# Patient Record
Sex: FEMALE | Race: WHITE
[De-identification: names, ages, dates, MRNs, and addresses within clinical notes are randomized per-mention and may not be internally consistent; named-entity substitution may affect disease eponyms.]

---

## 2017-01-18 ENCOUNTER — HOSPITAL ENCOUNTER (OUTPATIENT)
Dept: HOSPITAL 80 - CIMAGING | Age: 52
End: 2017-01-18
Attending: FAMILY MEDICINE
Payer: MEDICAID

## 2017-01-18 DIAGNOSIS — E78.2: ICD-10-CM

## 2017-01-18 DIAGNOSIS — G40.909: ICD-10-CM

## 2017-01-18 DIAGNOSIS — R22.1: Primary | ICD-10-CM

## 2017-01-18 DIAGNOSIS — N95.9: ICD-10-CM

## 2017-01-18 NOTE — US
Ultrasound of the Neck Soft Tissues



History: R22.1, swelling left side of the neck, E78.2, G40.909.



Technique: Longitudinal and transverse ultrasound imaging of the left neck soft tissues.



Findings: In the left neck soft tissues at the juncture of the left side of the neck and shoulder reg
ion ultrasound imaging was performed and demonstrates no evidence of discrete focal lesion. No fluid 
collection or cystic lesion is noted.



IMPRESSION:

1. Palpable abnormality in the left neck/shoulder region demonstrates no ultrasound evidence of discr
ete solid or cystic mass.

2. If palpable abnormality persists recommend additional imaging with either CT or MRI of the neck so
ft tissues.

## 2017-04-06 ENCOUNTER — HOSPITAL ENCOUNTER (OUTPATIENT)
Dept: HOSPITAL 80 - CIMAGING | Age: 52
End: 2017-04-06
Attending: FAMILY MEDICINE
Payer: MEDICAID

## 2017-04-06 DIAGNOSIS — R10.9: Primary | ICD-10-CM

## 2017-04-06 DIAGNOSIS — N88.9: ICD-10-CM

## 2017-04-06 DIAGNOSIS — Z87.442: ICD-10-CM

## 2017-07-10 ENCOUNTER — HOSPITAL ENCOUNTER (OUTPATIENT)
Dept: HOSPITAL 80 - FIMAGING | Age: 52
End: 2017-07-10
Attending: FAMILY MEDICINE
Payer: MEDICAID

## 2017-07-10 DIAGNOSIS — M22.41: Primary | ICD-10-CM

## 2017-07-10 DIAGNOSIS — M23.41: ICD-10-CM

## 2017-11-17 ENCOUNTER — HOSPITAL ENCOUNTER (EMERGENCY)
Dept: HOSPITAL 80 - CED | Age: 52
Discharge: HOME | End: 2017-11-17
Payer: MEDICAID

## 2017-11-17 VITALS
RESPIRATION RATE: 14 BRPM | HEART RATE: 78 BPM | TEMPERATURE: 98.8 F | DIASTOLIC BLOOD PRESSURE: 88 MMHG | SYSTOLIC BLOOD PRESSURE: 131 MMHG | OXYGEN SATURATION: 95 %
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
## 2017-11-17 DIAGNOSIS — S62.144A: Primary | ICD-10-CM

## 2017-11-17 PROCEDURE — A4565 SLINGS: HCPCS

## 2017-11-17 NOTE — EDPHY
H & P


Time Seen by Provider: 11/17/17 14:59


HPI/ROS: 





This patient complains of pain to the right hand/wrist area of the mid shaft of 

the 5th metacarpal rate heating into the wrist.  She explains that she stepped 

oddly causing right knee pain and stumble with a FOOSH mechanism injury to her 

right upper extremity at 1:30 a.m. today.  She has chronic right knee pains and 

reports that the mild pain in her right knee is no different from baseline for 

her.  However she reports that the wrist/hand pain is 5/10 at baseline sharp in 

nature and worse with movement.  She took ibuprofen shortly prior to arrival 

and thinks that that is starting to help with the severity.  No other 

exacerbating factors and no other injuries.





ROS:  Neuro:  No numbness or tingling associated with this injury


Integumentary:  No lacerations or abrasions


Musculoskeletal:  Chronic right knee pain as per HPI.  No other acute injuries 

besides the wrist/hand.


5 point ROS is otherwise negative


Past Medical/Surgical History: 





Epilepsy





Chronic right knee pain and knee crepitance


Social History: 





Patient occasionally uses marijuana.  Rare alcohol.  She is exposed to 

secondhand smoke but does not smoke herself.


Smoking Status: Never smoked


Physical Exam: 





Physical Exam


Vital signs are normal.


General:  No acute distress


HEENT:  Atraumatic.  


Eyes:  Pupils equal and react to light.  Extraocular motions are intact.


Lungs:  No respiratory distress.


Cardiac:  Brisk capillary refill is intact throughout.  Pulses are 2+ and 

symmetric in the affected extremity.


Skin:  No rash or pallor.


Extremities:  Atraumatic normal except for right wrist/hand that exam is 

notable for tenderness at the base of the 5th metacarpal without deformity that 

extends into the ulnar aspect of the wrist.  She has no anatomical snuffbox 

tenderness, no other volar or dorsal wrist tenderness.  No significant 

swelling.  There is mild swelling to the affected area mentioned-ulnar aspect 

of wrist and 5th metacarpal.  No lacerations or abrasions overlying this.


Neuro:  Alert with no sensorimotor deficits in the affected extremity.





Initial differential diagnosis:  5th metacarpal fracture, wrist fracture, wrist 

sprain, contusion


Constitutional: 


 Initial Vital Signs











Temperature (C)  37.1 C   11/17/17 14:58


 


Heart Rate  78   11/17/17 14:58


 


Respiratory Rate  14   11/17/17 14:58


 


Blood Pressure  131/88 H  11/17/17 14:58


 


O2 Sat (%)  95   11/17/17 14:58








 











O2 Delivery Mode               Room Air














Allergies/Adverse Reactions: 


 





erythromycin base [Erythromycin Base] Allergy (Intermediate, Verified 11/17/17 

14:56)


 RASH VOMITING


hydrocodone [Hydrocodone] Allergy (Intermediate, Verified 11/17/17 14:56)


 RASH, VOMITING


oxycodone [Oxycodone] Allergy (Intermediate, Verified 11/17/17 14:56)


 RASH VOMITING


phenytoin sodium [From Dilantin] Allergy (Verified 11/17/17 14:56)


 


phenytoin sodium extended [From Dilantin] Allergy (Verified 11/17/17 14:56)


 


all narcotics Allergy (Uncoded 11/17/17 14:56)


 








Home Medications: 














 Medication  Instructions  Recorded


 


LEVETIRACETAM  11/17/17


 


VIMPAT  11/17/17














MDM/Departure





- MDM


Diagnostics: 





Three-view hand Hand x-ray reveals cortical abnormality at the ulnar aspect of 

the body of the hamate with minimal displacement appears consistent with an 

avulsion fracture of the hamate by my interpretation.  I confirmed this with a 

consult to our radiologist Sd Chowdhury


Imaging Results: 


 Imaging Impressions





Hand X-Ray  11/17/17 15:06


Impression: Small nondisplaced capitate cortical chip fracture.


 


Findings reviewed with Emergency Department physician, Dr. Chris Weeks on 

November 17, 2017 at 1543 hours. 


 


 


 











Imaging: Discussed imaging studies w/ On call Radiologist


ED Course/Re-evaluation: 





Ortho Glass splint-ulnar gutter placed by our tech with my supervision to the 

affected right upper extremity.  Patient is neurovascularly intact post splint 

application.  Counseled regarding splint and fracture care.





Discussion:  Patient with a hamate fracture without neurovascular compromise or 

other complications splinted with plan to follow up with Dr. Landrum-the hand 

specialist on-call for Orthopedics.





The patient declined any other analgesics while here in the emergency department





- Depart


Disposition: Home, Routine, Self-Care


Clinical Impression: 


Closed hamate fracture


Qualifiers:


 Encounter type: initial encounter Hamate bone location: body Fracture alignment

: nondisplaced Laterality: right Qualified Code(s): S62.144A - Nondisplaced 

fracture of body of hamate [unciform] bone, right wrist, initial encounter for 

closed fracture





Condition: Good


Instructions:  Wrist Fracture in Adults (ED)


Additional Instructions: 


Diagnosis:  Hamate fracture of wrist





Plan:  Keep the splint on at all times clean and dry





Call Dr. landrum-orthopedic specialist arrange follow-up appointment for 

sometime in the next 3-5 days for recheck and casting or a different splint.





Ibuprofen Tylenol for pain as needed





Return if you develop unbearable pain, numbness the does improve loosening of 

the Ace wrap or other concerns.


Referrals: 


Magda Jernigan DO [Primary Care Provider] - As per Instructions


Carlos Landrum MD [Medical Doctor] - As per Instructions

## 2017-12-18 ENCOUNTER — HOSPITAL ENCOUNTER (OUTPATIENT)
Dept: HOSPITAL 80 - CIMAGING | Age: 52
End: 2017-12-18
Attending: FAMILY MEDICINE
Payer: MEDICAID

## 2017-12-18 DIAGNOSIS — R06.02: Primary | ICD-10-CM

## 2018-02-22 ENCOUNTER — HOSPITAL ENCOUNTER (OUTPATIENT)
Dept: HOSPITAL 80 - CIMAGING | Age: 53
End: 2018-02-22
Attending: FAMILY MEDICINE
Payer: MEDICAID

## 2018-02-22 DIAGNOSIS — Z87.891: ICD-10-CM

## 2018-02-22 DIAGNOSIS — J40: Primary | ICD-10-CM
